# Patient Record
Sex: FEMALE | Race: WHITE
[De-identification: names, ages, dates, MRNs, and addresses within clinical notes are randomized per-mention and may not be internally consistent; named-entity substitution may affect disease eponyms.]

---

## 2020-12-23 ENCOUNTER — HOSPITAL ENCOUNTER (OUTPATIENT)
Dept: HOSPITAL 62 - RAD | Age: 35
End: 2020-12-23
Payer: OTHER GOVERNMENT

## 2020-12-23 DIAGNOSIS — R10.2: ICD-10-CM

## 2020-12-23 DIAGNOSIS — N83.292: Primary | ICD-10-CM

## 2020-12-23 PROCEDURE — 76830 TRANSVAGINAL US NON-OB: CPT

## 2020-12-23 NOTE — RADIOLOGY REPORT (SQ)
EXAM DESCRIPTION:  U/S NON-OB PELVIS TV W/O DOP



IMAGES COMPLETED DATE/TIME:  12/23/2020 3:01 pm



REASON FOR STUDY:  (R10.2)PELVIC AND PERINEAL PAIN R10.2  PELVIC AND PERINEAL PAIN



COMPARISON:  None.



TECHNIQUE:  Dynamic and static grayscale images acquired of the pelvis via transabdominal and transva
ginal approach and recorded on PACS. Additional selected color Doppler and spectral images recorded.



LIMITATIONS:  None.



FINDINGS:  UTERUS: Contour normal.  No mass.

ENDOMETRIAL STRIPE: IUD is in place.  No focal thickening.

CERVIX: No nabothian cysts.

RIGHT OVARY AND DOPPLER: Normal size. No worrisome masses. Normal arterial vascular flow without evid
ence for torsion.

LEFT OVARY AND DOPPLER:  Normal size. No worrisome masses. Normal arterial vascular flow without evid
ence for torsion.  4 cm simple left ovarian cyst.

FREE FLUID: None noted.

OTHER: No other significant finding.

MEASUREMENTS:

UTERUS: 7.4 x 4.6 x 4.8 cm.

ENDOMETRIAL STRIPE: 8.9 mm.

RIGHT OVARY: 3.4 x 1.6 x 1.3 cm.

LEFT OVARY: 4.8 x 3.0 x 3.1 cm.



IMPRESSION:  Approximately 4 cm left ovarian cyst.  IUD is in place.  No other significant findings.



TECHNICAL DOCUMENTATION:  JOB ID:  [id number]

 [employer]- All Rights Reserved                           Rev-[id number]



Reading location - IP/workstation name: [email or web address]

## 2020-12-26 ENCOUNTER — HOSPITAL ENCOUNTER (OUTPATIENT)
Dept: HOSPITAL 62 - EMPHEALTH | Age: 35
End: 2020-12-26
Attending: INTERNAL MEDICINE
Payer: OTHER GOVERNMENT

## 2020-12-26 DIAGNOSIS — Z23: Primary | ICD-10-CM

## 2020-12-26 PROCEDURE — 91300: CPT

## 2021-01-15 ENCOUNTER — HOSPITAL ENCOUNTER (OUTPATIENT)
Dept: HOSPITAL 62 - EMPHEALTH | Age: 36
End: 2021-01-15
Attending: INTERNAL MEDICINE
Payer: SELF-PAY

## 2021-01-15 DIAGNOSIS — Z23: Primary | ICD-10-CM

## 2021-01-15 PROCEDURE — 91300: CPT
